# Patient Record
Sex: MALE | Race: ASIAN | NOT HISPANIC OR LATINO | URBAN - METROPOLITAN AREA
[De-identification: names, ages, dates, MRNs, and addresses within clinical notes are randomized per-mention and may not be internally consistent; named-entity substitution may affect disease eponyms.]

---

## 2018-01-28 ENCOUNTER — EMERGENCY (EMERGENCY)
Facility: HOSPITAL | Age: 63
LOS: 1 days | Discharge: LEFT BEFORE DISCHARGE | End: 2018-01-28
Attending: EMERGENCY MEDICINE | Admitting: EMERGENCY MEDICINE
Payer: MEDICAID

## 2018-01-28 VITALS
SYSTOLIC BLOOD PRESSURE: 106 MMHG | DIASTOLIC BLOOD PRESSURE: 58 MMHG | HEART RATE: 77 BPM | RESPIRATION RATE: 14 BRPM | OXYGEN SATURATION: 100 %

## 2018-01-28 VITALS
RESPIRATION RATE: 16 BRPM | DIASTOLIC BLOOD PRESSURE: 59 MMHG | SYSTOLIC BLOOD PRESSURE: 109 MMHG | HEART RATE: 82 BPM | OXYGEN SATURATION: 100 %

## 2018-01-28 LAB
ALBUMIN SERPL ELPH-MCNC: 4.8 G/DL — SIGNIFICANT CHANGE UP (ref 3.3–5)
ALP SERPL-CCNC: 69 U/L — SIGNIFICANT CHANGE UP (ref 40–120)
ALT FLD-CCNC: 57 U/L — HIGH (ref 4–41)
APAP SERPL-MCNC: < 15 UG/ML — LOW (ref 15–25)
AST SERPL-CCNC: 71 U/L — HIGH (ref 4–40)
BARBITURATES MEASUREMENT: NEGATIVE — SIGNIFICANT CHANGE UP
BASE EXCESS BLDV CALC-SCNC: -5.8 MMOL/L — SIGNIFICANT CHANGE UP
BASE EXCESS BLDV CALC-SCNC: -7.9 MMOL/L — SIGNIFICANT CHANGE UP
BASE EXCESS BLDV CALC-SCNC: -8.4 MMOL/L — SIGNIFICANT CHANGE UP
BASOPHILS # BLD AUTO: 0.04 K/UL — SIGNIFICANT CHANGE UP (ref 0–0.2)
BASOPHILS NFR BLD AUTO: 0.2 % — SIGNIFICANT CHANGE UP (ref 0–2)
BENZODIAZ SERPL-MCNC: NEGATIVE — SIGNIFICANT CHANGE UP
BILIRUB SERPL-MCNC: 0.4 MG/DL — SIGNIFICANT CHANGE UP (ref 0.2–1.2)
BLOOD GAS VENOUS - CREATININE: 0.6 MG/DL — SIGNIFICANT CHANGE UP (ref 0.5–1.3)
BLOOD GAS VENOUS - CREATININE: 0.72 MG/DL — SIGNIFICANT CHANGE UP (ref 0.5–1.3)
BLOOD GAS VENOUS - CREATININE: 0.8 MG/DL — SIGNIFICANT CHANGE UP (ref 0.5–1.3)
BUN SERPL-MCNC: 20 MG/DL — SIGNIFICANT CHANGE UP (ref 7–23)
BUN SERPL-MCNC: 24 MG/DL — HIGH (ref 7–23)
CALCIUM SERPL-MCNC: 7.9 MG/DL — LOW (ref 8.4–10.5)
CALCIUM SERPL-MCNC: 9.3 MG/DL — SIGNIFICANT CHANGE UP (ref 8.4–10.5)
CHLORIDE BLDV-SCNC: 104 MMOL/L — SIGNIFICANT CHANGE UP (ref 96–108)
CHLORIDE BLDV-SCNC: 105 MMOL/L — SIGNIFICANT CHANGE UP (ref 96–108)
CHLORIDE BLDV-SCNC: 105 MMOL/L — SIGNIFICANT CHANGE UP (ref 96–108)
CHLORIDE SERPL-SCNC: 101 MMOL/L — SIGNIFICANT CHANGE UP (ref 98–107)
CHLORIDE SERPL-SCNC: 97 MMOL/L — LOW (ref 98–107)
CO2 SERPL-SCNC: 16 MMOL/L — LOW (ref 22–31)
CO2 SERPL-SCNC: 17 MMOL/L — LOW (ref 22–31)
CREAT SERPL-MCNC: 0.76 MG/DL — SIGNIFICANT CHANGE UP (ref 0.5–1.3)
CREAT SERPL-MCNC: 0.89 MG/DL — SIGNIFICANT CHANGE UP (ref 0.5–1.3)
EOSINOPHIL # BLD AUTO: 0.06 K/UL — SIGNIFICANT CHANGE UP (ref 0–0.5)
EOSINOPHIL NFR BLD AUTO: 0.3 % — SIGNIFICANT CHANGE UP (ref 0–6)
ETHANOL BLD-MCNC: 187 MG/DL — HIGH
GAS PNL BLDV: 137 MMOL/L — SIGNIFICANT CHANGE UP (ref 136–146)
GAS PNL BLDV: 137 MMOL/L — SIGNIFICANT CHANGE UP (ref 136–146)
GAS PNL BLDV: 138 MMOL/L — SIGNIFICANT CHANGE UP (ref 136–146)
GLUCOSE BLDV-MCNC: 41 — CRITICAL LOW (ref 70–99)
GLUCOSE BLDV-MCNC: 86 — SIGNIFICANT CHANGE UP (ref 70–99)
GLUCOSE BLDV-MCNC: 97 — SIGNIFICANT CHANGE UP (ref 70–99)
GLUCOSE SERPL-MCNC: 39 MG/DL — CRITICAL LOW (ref 70–99)
GLUCOSE SERPL-MCNC: 97 MG/DL — SIGNIFICANT CHANGE UP (ref 70–99)
HCO3 BLDV-SCNC: 17 MMOL/L — LOW (ref 20–27)
HCO3 BLDV-SCNC: 17 MMOL/L — LOW (ref 20–27)
HCO3 BLDV-SCNC: 19 MMOL/L — LOW (ref 20–27)
HCT VFR BLD CALC: 41.2 % — SIGNIFICANT CHANGE UP (ref 39–50)
HCT VFR BLDV CALC: 36.7 % — LOW (ref 39–51)
HCT VFR BLDV CALC: 39 % — SIGNIFICANT CHANGE UP (ref 39–51)
HCT VFR BLDV CALC: 41.8 % — SIGNIFICANT CHANGE UP (ref 39–51)
HGB BLD-MCNC: 13.6 G/DL — SIGNIFICANT CHANGE UP (ref 13–17)
HGB BLDV-MCNC: 11.9 G/DL — LOW (ref 13–17)
HGB BLDV-MCNC: 12.7 G/DL — LOW (ref 13–17)
HGB BLDV-MCNC: 13.6 G/DL — SIGNIFICANT CHANGE UP (ref 13–17)
IMM GRANULOCYTES # BLD AUTO: 0.11 # — SIGNIFICANT CHANGE UP
IMM GRANULOCYTES NFR BLD AUTO: 0.5 % — SIGNIFICANT CHANGE UP (ref 0–1.5)
LACTATE BLDV-MCNC: 5.3 MMOL/L — CRITICAL HIGH (ref 0.5–2)
LACTATE BLDV-MCNC: 5.9 MMOL/L — CRITICAL HIGH (ref 0.5–2)
LACTATE BLDV-MCNC: 7.2 MMOL/L — CRITICAL HIGH (ref 0.5–2)
LYMPHOCYTES # BLD AUTO: 22.2 % — SIGNIFICANT CHANGE UP (ref 13–44)
LYMPHOCYTES # BLD AUTO: 4.64 K/UL — HIGH (ref 1–3.3)
MCHC RBC-ENTMCNC: 30.1 PG — SIGNIFICANT CHANGE UP (ref 27–34)
MCHC RBC-ENTMCNC: 33 % — SIGNIFICANT CHANGE UP (ref 32–36)
MCV RBC AUTO: 91.2 FL — SIGNIFICANT CHANGE UP (ref 80–100)
MONOCYTES # BLD AUTO: 0.41 K/UL — SIGNIFICANT CHANGE UP (ref 0–0.9)
MONOCYTES NFR BLD AUTO: 2 % — SIGNIFICANT CHANGE UP (ref 2–14)
NEUTROPHILS # BLD AUTO: 15.61 K/UL — HIGH (ref 1.8–7.4)
NEUTROPHILS NFR BLD AUTO: 74.8 % — SIGNIFICANT CHANGE UP (ref 43–77)
NRBC # FLD: 0 — SIGNIFICANT CHANGE UP
PCO2 BLDV: 44 MMHG — SIGNIFICANT CHANGE UP (ref 41–51)
PCO2 BLDV: 45 MMHG — SIGNIFICANT CHANGE UP (ref 41–51)
PCO2 BLDV: 49 MMHG — SIGNIFICANT CHANGE UP (ref 41–51)
PH BLDV: 7.21 PH — LOW (ref 7.32–7.43)
PH BLDV: 7.23 PH — LOW (ref 7.32–7.43)
PH BLDV: 7.27 PH — LOW (ref 7.32–7.43)
PLATELET # BLD AUTO: 211 K/UL — SIGNIFICANT CHANGE UP (ref 150–400)
PMV BLD: 11.5 FL — SIGNIFICANT CHANGE UP (ref 7–13)
PO2 BLDV: 49 MMHG — HIGH (ref 35–40)
PO2 BLDV: 51 MMHG — HIGH (ref 35–40)
PO2 BLDV: 77 MMHG — HIGH (ref 35–40)
POTASSIUM BLDV-SCNC: 3.2 MMOL/L — LOW (ref 3.4–4.5)
POTASSIUM BLDV-SCNC: 4.1 MMOL/L — SIGNIFICANT CHANGE UP (ref 3.4–4.5)
POTASSIUM BLDV-SCNC: 4.4 MMOL/L — SIGNIFICANT CHANGE UP (ref 3.4–4.5)
POTASSIUM SERPL-MCNC: 3.5 MMOL/L — SIGNIFICANT CHANGE UP (ref 3.5–5.3)
POTASSIUM SERPL-MCNC: 4.7 MMOL/L — SIGNIFICANT CHANGE UP (ref 3.5–5.3)
POTASSIUM SERPL-SCNC: 3.5 MMOL/L — SIGNIFICANT CHANGE UP (ref 3.5–5.3)
POTASSIUM SERPL-SCNC: 4.7 MMOL/L — SIGNIFICANT CHANGE UP (ref 3.5–5.3)
PROT SERPL-MCNC: 8.2 G/DL — SIGNIFICANT CHANGE UP (ref 6–8.3)
RBC # BLD: 4.52 M/UL — SIGNIFICANT CHANGE UP (ref 4.2–5.8)
RBC # FLD: 12.1 % — SIGNIFICANT CHANGE UP (ref 10.3–14.5)
SALICYLATES SERPL-MCNC: < 5 MG/DL — LOW (ref 15–30)
SAO2 % BLDV: 70.1 % — SIGNIFICANT CHANGE UP (ref 60–85)
SAO2 % BLDV: 77.3 % — SIGNIFICANT CHANGE UP (ref 60–85)
SAO2 % BLDV: 89.4 % — HIGH (ref 60–85)
SODIUM SERPL-SCNC: 137 MMOL/L — SIGNIFICANT CHANGE UP (ref 135–145)
SODIUM SERPL-SCNC: 141 MMOL/L — SIGNIFICANT CHANGE UP (ref 135–145)
WBC # BLD: 20.87 K/UL — HIGH (ref 3.8–10.5)
WBC # FLD AUTO: 20.87 K/UL — HIGH (ref 3.8–10.5)

## 2018-01-28 PROCEDURE — 99285 EMERGENCY DEPT VISIT HI MDM: CPT | Mod: 25

## 2018-01-28 PROCEDURE — 70450 CT HEAD/BRAIN W/O DYE: CPT | Mod: 26

## 2018-01-28 RX ORDER — SODIUM CHLORIDE 9 MG/ML
1000 INJECTION INTRAMUSCULAR; INTRAVENOUS; SUBCUTANEOUS ONCE
Qty: 0 | Refills: 0 | Status: COMPLETED | OUTPATIENT
Start: 2018-01-28 | End: 2018-01-28

## 2018-01-28 RX ORDER — DEXTROSE 50 % IN WATER 50 %
50 SYRINGE (ML) INTRAVENOUS ONCE
Qty: 0 | Refills: 0 | Status: COMPLETED | OUTPATIENT
Start: 2018-01-28 | End: 2018-01-28

## 2018-01-28 RX ADMIN — SODIUM CHLORIDE 1000 MILLILITER(S): 9 INJECTION INTRAMUSCULAR; INTRAVENOUS; SUBCUTANEOUS at 09:49

## 2018-01-28 RX ADMIN — SODIUM CHLORIDE 1000 MILLILITER(S): 9 INJECTION INTRAMUSCULAR; INTRAVENOUS; SUBCUTANEOUS at 13:01

## 2018-01-28 RX ADMIN — Medication 50 MILLILITER(S): at 07:30

## 2018-01-28 NOTE — ED PROVIDER NOTE - MEDICAL DECISION MAKING DETAILS
63 yo man bibems for ams. Likely intoxication cause mental status change and hypoglycemia. Will check labs, ct head (for ?trauma). Observe for sobriety, feed when able.

## 2018-01-28 NOTE — ED ADULT TRIAGE NOTE - CHIEF COMPLAINT QUOTE
Pt BIBA for unresponsiveness. On EMS arrival, FS low. On arrival to ED, pt not responding to verbal, tactile or painful stimuli. FS 35 and repeat FS 36 in triage. Charge RN aware, pt brought back to room14. 20G IV inserted in R hand in ambulance triage, given 1amp dextrose.

## 2018-01-28 NOTE — ED PROVIDER NOTE - ATTENDING CONTRIBUTION TO CARE
agree with resident note  63 yo man w/ h/o htn, dm who presents BIBA after family found him on floor by bedside minimally responsive.  Per family pt with hx of alcohol dependence returned home late last night intoxicated.  Pt vy EMS was hypoglycemic given D50 with improvement.      PE: disheveled appears intoxicated; NCAT; PERRL; CTAB/L; s1 s2 no m/r/g abd soft/NT/ND ext: no edema    signed out to incoming team pending labs and reassessment

## 2018-01-28 NOTE — ED PROVIDER NOTE - CARE PLAN
Principal Discharge DX:	Alcohol intoxication  Assessment and plan of treatment:	Call your primary care doctor today or tomorrow to schedule follow up appointment for tomorrow.  Continue taking your medications as prescribed.  Return to this Emergency Department if you experience worsening condition or for any other emergency.  Secondary Diagnosis:	Metabolic acidosis

## 2018-01-28 NOTE — ED PROVIDER NOTE - PROGRESS NOTE DETAILS
signed out by Dr. Leach.  Pt now awake, alert, ate lunch.  Labs show minimal lactate improvement and repeat gap of 20.  Disucced with renal ?metformin but given creatinine and how well pt appears now, roxanneley.  Will continue hydration, get urine. Riki Kong MD PGY4: Labs, imaging reviewed. Received IVF x2L w/o significant improvement in labs. Discussed w/ nephrology, recommended admission for further workup, though unlikely metformin toxicity given normal renal function. Pt now reassessed and feels at baseline, aox3, no longer intoxicated. Family at bedside. Informed pt that we are recommending admission, but he refused wishing to return home. Family supportive of decision and requesting to take him home. Pt is of clear understanding and able to make his own medical decisions. I discussed our concerns (in layman terms) for metabolic acidosis nos, severe infection, vs other and pt verbalized understanding but remained steadfast in his request to leave. Will allow to leave against medical advice w/ strong charge to f/u w/ pmd asap and return for worsening condition.

## 2018-01-28 NOTE — ED PROVIDER NOTE - PLAN OF CARE
Call your primary care doctor today or tomorrow to schedule follow up appointment for tomorrow.  Continue taking your medications as prescribed.  Return to this Emergency Department if you experience worsening condition or for any other emergency.

## 2018-01-28 NOTE — ED ADULT NURSE NOTE - PMH
BPH (benign prostatic hypertrophy)    Chest swelling    HTN (hypertension)    Hyperlipidemia    Type 2 diabetes mellitus

## 2018-01-28 NOTE — ED PROVIDER NOTE - OBJECTIVE STATEMENT
[age] yo man w/ h/o htn, dm bibems for ams. Per son, states pt was out last night and returned late intoxicated, which he does a couple times per month. Several hours later, pt's wife found him on the floor by the side of the bed and was brought in. Pt found to be hypoglycemic in field, received D50 upon arrival to ED.   In ED, pt provides no history. Per son, pt was acting normally before this. On Januvia and Metformin.
